# Patient Record
Sex: FEMALE | Race: WHITE | NOT HISPANIC OR LATINO | Employment: FULL TIME
[De-identification: names, ages, dates, MRNs, and addresses within clinical notes are randomized per-mention and may not be internally consistent; named-entity substitution may affect disease eponyms.]

---

## 2018-12-27 ENCOUNTER — IMAGING SERVICES (OUTPATIENT)
Dept: OTHER | Age: 53
End: 2018-12-27

## 2021-07-26 ENCOUNTER — HOSPITAL ENCOUNTER (EMERGENCY)
Age: 56
Discharge: HOME | End: 2021-07-26
Attending: EMERGENCY MEDICINE

## 2021-07-26 ENCOUNTER — APPOINTMENT (OUTPATIENT)
Dept: GENERAL RADIOLOGY | Age: 56
End: 2021-07-26
Attending: EMERGENCY MEDICINE

## 2021-07-26 VITALS
OXYGEN SATURATION: 98 % | TEMPERATURE: 98.4 F | SYSTOLIC BLOOD PRESSURE: 130 MMHG | HEART RATE: 80 BPM | DIASTOLIC BLOOD PRESSURE: 80 MMHG | RESPIRATION RATE: 14 BRPM

## 2021-07-26 DIAGNOSIS — F45.8 ACUTE HYPERVENTILATION SYNDROME: Primary | ICD-10-CM

## 2021-07-26 LAB
ALBUMIN SERPL-MCNC: 3.5 G/DL (ref 3.6–5.1)
ALBUMIN/GLOB SERPL: 0.8 {RATIO} (ref 1–2.4)
ALP SERPL-CCNC: 65 UNITS/L (ref 45–117)
ALT SERPL-CCNC: 35 UNITS/L
ANION GAP SERPL CALC-SCNC: 12 MMOL/L (ref 10–20)
APPEARANCE UR: CLEAR
AST SERPL-CCNC: 35 UNITS/L
ATRIAL RATE (BPM): 85
BASOPHILS # BLD: 0.1 K/MCL (ref 0–0.3)
BASOPHILS NFR BLD: 2 %
BILIRUB SERPL-MCNC: 0.5 MG/DL (ref 0.2–1)
BILIRUB UR QL STRIP: NEGATIVE
BUN SERPL-MCNC: 10 MG/DL (ref 6–20)
BUN/CREAT SERPL: 14 (ref 7–25)
CALCIUM SERPL-MCNC: 8.6 MG/DL (ref 8.4–10.2)
CHLORIDE SERPL-SCNC: 107 MMOL/L (ref 98–107)
CO2 SERPL-SCNC: 23 MMOL/L (ref 21–32)
COLOR UR: COLORLESS
CREAT SERPL-MCNC: 0.7 MG/DL (ref 0.51–0.95)
DEPRECATED RDW RBC: 59.9 FL (ref 39–50)
EOSINOPHIL # BLD: 0.1 K/MCL (ref 0–0.5)
EOSINOPHIL NFR BLD: 2 %
ERYTHROCYTE [DISTWIDTH] IN BLOOD: 13.7 % (ref 11–15)
FASTING DURATION TIME PATIENT: ABNORMAL H
GFR SERPLBLD BASED ON 1.73 SQ M-ARVRAT: >90 ML/MIN/1.73M2
GLOBULIN SER-MCNC: 4.4 G/DL (ref 2–4)
GLUCOSE SERPL-MCNC: 110 MG/DL (ref 65–99)
GLUCOSE UR STRIP-MCNC: NEGATIVE MG/DL
HCT VFR BLD CALC: 29.7 % (ref 36–46.5)
HGB BLD-MCNC: 10.3 G/DL (ref 12–15.5)
HGB UR QL STRIP: NEGATIVE
IMM GRANULOCYTES # BLD AUTO: 0 K/MCL (ref 0–0.2)
IMM GRANULOCYTES # BLD: 0 %
KETONES UR STRIP-MCNC: NEGATIVE MG/DL
LEUKOCYTE ESTERASE UR QL STRIP: NEGATIVE
LYMPHOCYTES # BLD: 1.2 K/MCL (ref 1–4)
LYMPHOCYTES NFR BLD: 32 %
MCH RBC QN AUTO: 41.4 PG (ref 26–34)
MCHC RBC AUTO-ENTMCNC: 34.7 G/DL (ref 32–36.5)
MCV RBC AUTO: 119.3 FL (ref 78–100)
MONOCYTES # BLD: 0.6 K/MCL (ref 0.3–0.9)
MONOCYTES NFR BLD: 15 %
NEUTROPHILS # BLD: 2 K/MCL (ref 1.8–7.7)
NEUTROPHILS NFR BLD: 49 %
NITRITE UR QL STRIP: NEGATIVE
NRBC BLD MANUAL-RTO: 0 /100 WBC
P AXIS (DEGREES): 55
PH UR STRIP: 8 [PH] (ref 5–7)
PLATELET # BLD AUTO: 403 K/MCL (ref 140–450)
POTASSIUM SERPL-SCNC: 3.3 MMOL/L (ref 3.4–5.1)
PR-INTERVAL (MSEC): 135
PROT SERPL-MCNC: 7.9 G/DL (ref 6.4–8.2)
PROT UR STRIP-MCNC: NEGATIVE MG/DL
QRS-INTERVAL (MSEC): 102
QT-INTERVAL (MSEC): 410
QTC: 491
R AXIS (DEGREES): 59
RAINBOW EXTRA TUBES HOLD SPECIMEN: NORMAL
RBC # BLD: 2.49 MIL/MCL (ref 4–5.2)
REPORT TEXT: NORMAL
SODIUM SERPL-SCNC: 139 MMOL/L (ref 135–145)
SP GR UR STRIP: <1.005 (ref 1–1.03)
T AXIS (DEGREES): 34
TROPONIN I SERPL HS-MCNC: <0.02 NG/ML
TSH SERPL-ACNC: 0.39 MCUNITS/ML (ref 0.35–5)
UROBILINOGEN UR STRIP-MCNC: 0.2 MG/DL
VENTRICULAR RATE EKG/MIN (BPM): 86
WBC # BLD: 3.9 K/MCL (ref 4.2–11)

## 2021-07-26 PROCEDURE — 85025 COMPLETE CBC W/AUTO DIFF WBC: CPT | Performed by: EMERGENCY MEDICINE

## 2021-07-26 PROCEDURE — 93005 ELECTROCARDIOGRAM TRACING: CPT | Performed by: EMERGENCY MEDICINE

## 2021-07-26 PROCEDURE — 81003 URINALYSIS AUTO W/O SCOPE: CPT | Performed by: INTERNAL MEDICINE

## 2021-07-26 PROCEDURE — 36415 COLL VENOUS BLD VENIPUNCTURE: CPT

## 2021-07-26 PROCEDURE — 93010 ELECTROCARDIOGRAM REPORT: CPT | Performed by: INTERNAL MEDICINE

## 2021-07-26 PROCEDURE — 84443 ASSAY THYROID STIM HORMONE: CPT | Performed by: INTERNAL MEDICINE

## 2021-07-26 PROCEDURE — 84484 ASSAY OF TROPONIN QUANT: CPT | Performed by: EMERGENCY MEDICINE

## 2021-07-26 PROCEDURE — 99285 EMERGENCY DEPT VISIT HI MDM: CPT

## 2021-07-26 PROCEDURE — 99285 EMERGENCY DEPT VISIT HI MDM: CPT | Performed by: INTERNAL MEDICINE

## 2021-07-26 PROCEDURE — 80053 COMPREHEN METABOLIC PANEL: CPT | Performed by: EMERGENCY MEDICINE

## 2021-07-26 PROCEDURE — 71046 X-RAY EXAM CHEST 2 VIEWS: CPT

## 2021-07-26 RX ORDER — HYDROXYUREA 500 MG/1
CAPSULE ORAL
COMMUNITY
Start: 2021-06-28

## 2021-07-26 RX ORDER — APIXABAN 5 MG/1
TABLET, FILM COATED ORAL
COMMUNITY
Start: 2021-06-30

## 2021-07-26 ASSESSMENT — ENCOUNTER SYMPTOMS
WOUND: 0
SPEECH DIFFICULTY: 0
FEVER: 0
CHEST TIGHTNESS: 0
DIARRHEA: 0
CONSTIPATION: 0
LIGHT-HEADEDNESS: 1
ABDOMINAL PAIN: 0
SHORTNESS OF BREATH: 0
FACIAL ASYMMETRY: 0
DIAPHORESIS: 0
TROUBLE SWALLOWING: 0
FATIGUE: 0
COLOR CHANGE: 0
NUMBNESS: 1
VOMITING: 0
CHILLS: 0
SEIZURES: 0
WEAKNESS: 1
DIZZINESS: 0
COUGH: 0

## 2022-03-27 ENCOUNTER — LAB ENCOUNTER (OUTPATIENT)
Dept: LAB | Facility: HOSPITAL | Age: 57
End: 2022-03-27
Attending: ORTHOPAEDIC SURGERY
Payer: COMMERCIAL

## 2022-03-27 DIAGNOSIS — Z01.818 PRE-OP TESTING: ICD-10-CM

## 2022-03-28 RX ORDER — HYDROXYUREA 500 MG/1
CAPSULE ORAL 2 TIMES DAILY
COMMUNITY

## 2022-03-28 RX ORDER — LEVOTHYROXINE SODIUM 112 UG/1
112 TABLET ORAL
COMMUNITY

## 2022-03-28 NOTE — PAT NURSING NOTE
Spoke with Annalise Gonzalez at Dr. Laron Gonzalez office regarding patient's Eliquis. Per patient last dose was 3/27 in the evening and had not taken her morning dose. Instructions given to patient to call PCP and verify when to stop. Annalise Gonzalez will follow up with patient today.

## 2022-03-30 ENCOUNTER — ANESTHESIA EVENT (OUTPATIENT)
Dept: SURGERY | Facility: HOSPITAL | Age: 57
End: 2022-03-30
Payer: COMMERCIAL

## 2022-03-30 ENCOUNTER — HOSPITAL ENCOUNTER (OUTPATIENT)
Facility: HOSPITAL | Age: 57
Setting detail: HOSPITAL OUTPATIENT SURGERY
Discharge: HOME | End: 2022-03-30
Attending: ORTHOPAEDIC SURGERY | Admitting: ORTHOPAEDIC SURGERY
Payer: COMMERCIAL

## 2022-03-30 ENCOUNTER — ANESTHESIA (OUTPATIENT)
Dept: SURGERY | Facility: HOSPITAL | Age: 57
End: 2022-03-30
Payer: COMMERCIAL

## 2022-03-30 VITALS
BODY MASS INDEX: 24.11 KG/M2 | SYSTOLIC BLOOD PRESSURE: 144 MMHG | TEMPERATURE: 99 F | DIASTOLIC BLOOD PRESSURE: 78 MMHG | RESPIRATION RATE: 16 BRPM | HEART RATE: 77 BPM | OXYGEN SATURATION: 98 % | WEIGHT: 150 LBS | HEIGHT: 66 IN

## 2022-03-30 DIAGNOSIS — Z01.818 PRE-OP TESTING: Primary | ICD-10-CM

## 2022-03-30 PROCEDURE — 01N50ZZ RELEASE MEDIAN NERVE, OPEN APPROACH: ICD-10-PCS | Performed by: ORTHOPAEDIC SURGERY

## 2022-03-30 PROCEDURE — 8E0XXBZ COMPUTER ASSISTED PROCEDURE OF UPPER EXTREMITY: ICD-10-PCS | Performed by: ORTHOPAEDIC SURGERY

## 2022-03-30 RX ORDER — PROCHLORPERAZINE EDISYLATE 5 MG/ML
5 INJECTION INTRAMUSCULAR; INTRAVENOUS ONCE AS NEEDED
Status: DISCONTINUED | OUTPATIENT
Start: 2022-03-30 | End: 2022-03-30

## 2022-03-30 RX ORDER — LIDOCAINE HYDROCHLORIDE 10 MG/ML
INJECTION, SOLUTION EPIDURAL; INFILTRATION; INTRACAUDAL; PERINEURAL AS NEEDED
Status: DISCONTINUED | OUTPATIENT
Start: 2022-03-30 | End: 2022-03-30 | Stop reason: SURG

## 2022-03-30 RX ORDER — HYDROMORPHONE HYDROCHLORIDE 1 MG/ML
0.2 INJECTION, SOLUTION INTRAMUSCULAR; INTRAVENOUS; SUBCUTANEOUS EVERY 5 MIN PRN
Status: DISCONTINUED | OUTPATIENT
Start: 2022-03-30 | End: 2022-03-30

## 2022-03-30 RX ORDER — MORPHINE SULFATE 10 MG/ML
6 INJECTION, SOLUTION INTRAMUSCULAR; INTRAVENOUS EVERY 10 MIN PRN
Status: DISCONTINUED | OUTPATIENT
Start: 2022-03-30 | End: 2022-03-30

## 2022-03-30 RX ORDER — ONDANSETRON 2 MG/ML
4 INJECTION INTRAMUSCULAR; INTRAVENOUS ONCE AS NEEDED
Status: DISCONTINUED | OUTPATIENT
Start: 2022-03-30 | End: 2022-03-30

## 2022-03-30 RX ORDER — SODIUM CHLORIDE, SODIUM LACTATE, POTASSIUM CHLORIDE, CALCIUM CHLORIDE 600; 310; 30; 20 MG/100ML; MG/100ML; MG/100ML; MG/100ML
INJECTION, SOLUTION INTRAVENOUS CONTINUOUS
Status: DISCONTINUED | OUTPATIENT
Start: 2022-03-30 | End: 2022-03-30

## 2022-03-30 RX ORDER — NALOXONE HYDROCHLORIDE 0.4 MG/ML
80 INJECTION, SOLUTION INTRAMUSCULAR; INTRAVENOUS; SUBCUTANEOUS AS NEEDED
Status: DISCONTINUED | OUTPATIENT
Start: 2022-03-30 | End: 2022-03-30

## 2022-03-30 RX ORDER — ACETAMINOPHEN 500 MG
1000 TABLET ORAL ONCE
Status: COMPLETED | OUTPATIENT
Start: 2022-03-30 | End: 2022-03-30

## 2022-03-30 RX ORDER — HYDROMORPHONE HYDROCHLORIDE 1 MG/ML
0.4 INJECTION, SOLUTION INTRAMUSCULAR; INTRAVENOUS; SUBCUTANEOUS EVERY 5 MIN PRN
Status: DISCONTINUED | OUTPATIENT
Start: 2022-03-30 | End: 2022-03-30

## 2022-03-30 RX ORDER — HYDROCODONE BITARTRATE AND ACETAMINOPHEN 5; 325 MG/1; MG/1
2 TABLET ORAL AS NEEDED
Status: DISCONTINUED | OUTPATIENT
Start: 2022-03-30 | End: 2022-03-30

## 2022-03-30 RX ORDER — HALOPERIDOL 5 MG/ML
0.25 INJECTION INTRAMUSCULAR ONCE AS NEEDED
Status: DISCONTINUED | OUTPATIENT
Start: 2022-03-30 | End: 2022-03-30

## 2022-03-30 RX ORDER — MORPHINE SULFATE 4 MG/ML
2 INJECTION, SOLUTION INTRAMUSCULAR; INTRAVENOUS EVERY 10 MIN PRN
Status: DISCONTINUED | OUTPATIENT
Start: 2022-03-30 | End: 2022-03-30

## 2022-03-30 RX ORDER — HYDROMORPHONE HYDROCHLORIDE 1 MG/ML
0.6 INJECTION, SOLUTION INTRAMUSCULAR; INTRAVENOUS; SUBCUTANEOUS EVERY 5 MIN PRN
Status: DISCONTINUED | OUTPATIENT
Start: 2022-03-30 | End: 2022-03-30

## 2022-03-30 RX ORDER — HYDROCODONE BITARTRATE AND ACETAMINOPHEN 5; 325 MG/1; MG/1
1 TABLET ORAL AS NEEDED
Status: DISCONTINUED | OUTPATIENT
Start: 2022-03-30 | End: 2022-03-30

## 2022-03-30 RX ORDER — MORPHINE SULFATE 4 MG/ML
4 INJECTION, SOLUTION INTRAMUSCULAR; INTRAVENOUS EVERY 10 MIN PRN
Status: DISCONTINUED | OUTPATIENT
Start: 2022-03-30 | End: 2022-03-30

## 2022-03-30 RX ORDER — CEFAZOLIN SODIUM/WATER 2 G/20 ML
2 SYRINGE (ML) INTRAVENOUS ONCE
Status: COMPLETED | OUTPATIENT
Start: 2022-03-30 | End: 2022-03-30

## 2022-03-30 RX ADMIN — LIDOCAINE HYDROCHLORIDE 25 MG: 10 INJECTION, SOLUTION EPIDURAL; INFILTRATION; INTRACAUDAL; PERINEURAL at 12:28:00

## 2022-03-30 RX ADMIN — CEFAZOLIN SODIUM/WATER 2 G: 2 G/20 ML SYRINGE (ML) INTRAVENOUS at 12:31:00

## 2022-03-30 NOTE — ANESTHESIA POSTPROCEDURE EVALUATION
Patient: Gale Trinh    Procedure Summary     Date: 03/30/22 Room / Location: [facility] MAIN OR  / [facility] MAIN OR    Anesthesia Start: 1826 Anesthesia Stop:     Procedure: left carpal tunnel release, motor branch release (Left Wrist) Diagnosis: (paresthesia of hand)    Surgeons: Sandi Flores MD Anesthesiologist: Apolonia Sanchez MD    Anesthesia Type: MAC ASA Status: 3          Anesthesia Type: MAC    Vitals Value Taken Time   /83 03/30/22 1315   Temp 97 03/30/22 1316   Pulse 75 03/30/22 1315   Resp 11 03/30/22 1315   SpO2 99 % 03/30/22 1315   Vitals shown include unvalidated device data.     [facility] Post Evaluation:   Patient Evaluated in PACU  Patient Participation: complete - patient participated  Level of Consciousness: awake  Pain Management: adequate  Airway Patency:patent  Dental exam unchanged from preop  Yes    Cardiovascular Status: acceptable  Respiratory Status: acceptable  Postoperative Hydration acceptable      Jack Rodriguez MD  3/30/2022 1:16 PM

## 2022-03-30 NOTE — DISCHARGE SUMMARY
S/p left carpal tunnel release    Stable    Regular diet    Norco as needed    Return to my office 1 seek

## 2022-03-31 NOTE — OPERATIVE REPORT
[facility]    PATIENT'S NAME: Vonda PATTERSON   ATTENDING PHYSICIAN: [email or web address] Abrahan Moran MD   OPERATING PHYSICIAN: Felton Davidson. Jonas Moran MD   PATIENT ACCOUNT#:   [id number]    LOCATION:  [facility] 1 [facility] 10  MEDICAL RECORD #:   [id number]       YOB: 1965  ADMISSION DATE:       03/30/2022      OPERATION DATE:  03/30/2022    OPERATIVE REPORT    PREOPERATIVE DIAGNOSIS:  Left carpal tunnel syndrome with thenar atrophy. POSTOPERATIVE DIAGNOSIS:  Left carpal tunnel syndrome with thenar atrophy. PROCEDURE:  Left carpal tunnel release with exploration of recurrent motor branch of the left median nerve. ASSISTANT:  None. ESTIMATED BLOOD LOSS:  Minimal.    INDICATIONS:  This is a 82-year-old, right-hand dominant woman with left carpal tunnel syndrome. She is noted to have atrophy now of the thenar eminence. Patient now being taken to the operating room for operative treatment of this condition having failed conservative treatment. I discussed the diagnosis, planned operative procedures, risks, benefits, alternatives, as well as potential complications with her. Questions were answered. She voiced understanding and agreement with proceeding with surgery. OPERATIVE TECHNIQUE:  Patient seen preoperatively in the holding area and marked confirming the left wrist as the operative site. Patient taken to the operating room. A time-out was called confirming the left wrist as the operative site. Patient placed under IV sedation, received antibiotics prior to the onset of procedure. DVT prophylaxis with SCDs only. Left upper extremity cleaned, prepped and draped in usual sterile manner, the arm exsanguinated, the tourniquet inflated around the proximal arm to 250 mmHg; 9 mL of 1% lidocaine with epinephrine were instilled between thenar and hypothenar eminence.   After adequate anesthesia was assured, a curvilinear incision made between thenar and hypothenar eminence, carried down to subcutaneous tissue with knife and tenotomy scissors. Loupe magnification used throughout for identification of structures. Hemostasis obtained with bipolar electrocautery. Some fibers of the palmar fascia were divided. The majority retracted. The distal edge of the transverse carpal ligament was identified. Hemostats placed deep to this to protect the underlying structures and the transverse carpal ligament was divided from distal to proximal using a 15 blade knife with further dissection and under direct observation, transverse carpal ligament was divided on to antebrachial fascia with tenotomy or Littler scissors. Viewing the contents of carpal canal demonstrated moderate hourglass deformity of the median nerve. There was quite a bit of adherence of the nerve to the underlying transverse carpal ligament and, to a lesser degree, the flexor tendons. The nerve was freed up from proximal to distal.  Care was taken then to search for and identify the motor branch. The motor branch was identified and the fascia opened around the nerve so that there would be not any constriction of this and this gave the nerve more free movement and no tension or compression. The wound was irrigated. Final hemostasis obtained with bipolar electrocautery. It was not felt that the tenosynovial tissue was a big factor taking up space and so no tenosynovectomy was performed. Incision closed with 5-0 nylon running and alternating stitch of 5-0 nylon using simple and horizontal mattress suture alternating. Dressed with Xeroform gauze, 4x4s, Kerlix, and Ace wrap. Tourniquet was deflated at the end of the case. No drain. No specimen. No complications. Patient awakened at the end of the procedure, taken to recovery room awake and stable. Dictated By Severiano Marina Farris Ehrich, MD  d: 03/30/2022 13:36:21  t: 03/31/2022 02:48:58  Job [id number]  [facility]/

## 2025-01-15 ENCOUNTER — TELEPHONE (OUTPATIENT)
Dept: MAMMOGRAPHY | Age: 60
End: 2025-01-15

## 2025-01-17 ENCOUNTER — TELEPHONE (OUTPATIENT)
Dept: MAMMOGRAPHY | Age: 60
End: 2025-01-17

## 2025-02-08 ENCOUNTER — HOSPITAL ENCOUNTER (OUTPATIENT)
Dept: MAMMOGRAPHY | Age: 60
End: 2025-02-08
Attending: FAMILY MEDICINE

## 2025-02-08 DIAGNOSIS — Z12.31 ENCOUNTER FOR SCREENING MAMMOGRAM FOR MALIGNANT NEOPLASM OF BREAST: ICD-10-CM

## 2025-02-08 PROCEDURE — 77067 SCR MAMMO BI INCL CAD: CPT

## (undated) DEVICE — DISPOSABLE BIPOLAR FORCEPS 4" (10.2CM) JEWELERS, STRAIGHT 0.4MM TIP AND 12 FT. (3.6M) CABLE: Brand: KIRWAN

## (undated) DEVICE — GAMMEX® NON-LATEX PI ORTHO SIZE 7.5, STERILE POLYISOPRENE POWDER-FREE SURGICAL GLOVE: Brand: GAMMEX

## (undated) DEVICE — BANDAGE COBAN 5X2 TAN LTX

## (undated) DEVICE — BANDAGE ROLL,100% COTTON, 6 PLY, LARGE: Brand: KERLIX

## (undated) DEVICE — SOL  .9 1000ML BTL

## (undated) DEVICE — OCCLUSIVE GAUZE STRIP,3% BISMUTH TRIBROMOPHENATE IN PETROLATUM BLEND: Brand: XEROFORM

## (undated) DEVICE — UPPER EXTREMITY: Brand: MEDLINE INDUSTRIES, INC.

## (undated) DEVICE — TRAY SKIN PREP PVP-1

## (undated) DEVICE — HOLDER NDL 6.25IN STN FIN SS

## (undated) DEVICE — SPLINT PLASTER 4

## (undated) DEVICE — SUTURE ETHILON 5-0 698G

## (undated) DEVICE — DISPOSABLE TOURNIQUET CUFF DUAL BLADDER, DUAL PORT AND QUICK CONNECT CONNECTOR: Brand: COLOR CUFF

## (undated) DEVICE — COVER SGL STRL LGHT HNDL BLU